# Patient Record
Sex: MALE | Race: WHITE | Employment: FULL TIME | URBAN - METROPOLITAN AREA
[De-identification: names, ages, dates, MRNs, and addresses within clinical notes are randomized per-mention and may not be internally consistent; named-entity substitution may affect disease eponyms.]

---

## 2020-06-17 ENCOUNTER — APPOINTMENT (OUTPATIENT)
Dept: GENERAL RADIOLOGY | Age: 35
End: 2020-06-17
Attending: PHYSICIAN ASSISTANT
Payer: COMMERCIAL

## 2020-06-17 ENCOUNTER — HOSPITAL ENCOUNTER (EMERGENCY)
Age: 35
Discharge: HOME | End: 2020-06-17
Attending: EMERGENCY MEDICINE | Admitting: EMERGENCY MEDICINE
Payer: COMMERCIAL

## 2020-06-17 VITALS
DIASTOLIC BLOOD PRESSURE: 72 MMHG | RESPIRATION RATE: 18 BRPM | TEMPERATURE: 97.9 F | SYSTOLIC BLOOD PRESSURE: 115 MMHG | BODY MASS INDEX: 32.27 KG/M2 | HEART RATE: 60 BPM | HEIGHT: 76 IN | OXYGEN SATURATION: 98 % | WEIGHT: 265 LBS

## 2020-06-17 DIAGNOSIS — Z20.822 SUSPECTED COVID-19 VIRUS INFECTION: ICD-10-CM

## 2020-06-17 DIAGNOSIS — B34.9 VIRAL INFECTION: Primary | ICD-10-CM

## 2020-06-17 LAB
ALBUMIN SERPL-MCNC: 3.7 G/DL (ref 3.5–5)
ALBUMIN/GLOB SERPL: 1 {RATIO} (ref 1.1–2.2)
ALP SERPL-CCNC: 64 U/L (ref 45–117)
ALT SERPL-CCNC: 32 U/L (ref 12–78)
ANION GAP SERPL CALC-SCNC: 4 MMOL/L (ref 5–15)
AST SERPL-CCNC: 11 U/L (ref 15–37)
ATRIAL RATE: 64 BPM
BASOPHILS # BLD: 0 K/UL (ref 0–0.1)
BASOPHILS NFR BLD: 0 % (ref 0–1)
BILIRUB SERPL-MCNC: 0.6 MG/DL (ref 0.2–1)
BUN SERPL-MCNC: 11 MG/DL (ref 6–20)
BUN/CREAT SERPL: 12 (ref 12–20)
CALCIUM SERPL-MCNC: 8.6 MG/DL (ref 8.5–10.1)
CALCULATED P AXIS, ECG09: 23 DEGREES
CALCULATED R AXIS, ECG10: 27 DEGREES
CALCULATED T AXIS, ECG11: 20 DEGREES
CHLORIDE SERPL-SCNC: 108 MMOL/L (ref 97–108)
CO2 SERPL-SCNC: 27 MMOL/L (ref 21–32)
COMMENT, HOLDF: NORMAL
CREAT SERPL-MCNC: 0.91 MG/DL (ref 0.7–1.3)
DIAGNOSIS, 93000: NORMAL
DIFFERENTIAL METHOD BLD: NORMAL
EOSINOPHIL # BLD: 0.1 K/UL (ref 0–0.4)
EOSINOPHIL NFR BLD: 1 % (ref 0–7)
ERYTHROCYTE [DISTWIDTH] IN BLOOD BY AUTOMATED COUNT: 12.8 % (ref 11.5–14.5)
GLOBULIN SER CALC-MCNC: 3.8 G/DL (ref 2–4)
GLUCOSE SERPL-MCNC: 83 MG/DL (ref 65–100)
HCT VFR BLD AUTO: 44.1 % (ref 36.6–50.3)
HGB BLD-MCNC: 14.4 G/DL (ref 12.1–17)
IMM GRANULOCYTES # BLD AUTO: 0 K/UL (ref 0–0.04)
IMM GRANULOCYTES NFR BLD AUTO: 0 % (ref 0–0.5)
LYMPHOCYTES # BLD: 2 K/UL (ref 0.8–3.5)
LYMPHOCYTES NFR BLD: 28 % (ref 12–49)
MCH RBC QN AUTO: 27.1 PG (ref 26–34)
MCHC RBC AUTO-ENTMCNC: 32.7 G/DL (ref 30–36.5)
MCV RBC AUTO: 83.1 FL (ref 80–99)
MONOCYTES # BLD: 0.5 K/UL (ref 0–1)
MONOCYTES NFR BLD: 8 % (ref 5–13)
NEUTS SEG # BLD: 4.5 K/UL (ref 1.8–8)
NEUTS SEG NFR BLD: 63 % (ref 32–75)
NRBC # BLD: 0 K/UL (ref 0–0.01)
NRBC BLD-RTO: 0 PER 100 WBC
P-R INTERVAL, ECG05: 182 MS
PLATELET # BLD AUTO: 348 K/UL (ref 150–400)
PMV BLD AUTO: 9.6 FL (ref 8.9–12.9)
POTASSIUM SERPL-SCNC: 3.8 MMOL/L (ref 3.5–5.1)
PROT SERPL-MCNC: 7.5 G/DL (ref 6.4–8.2)
Q-T INTERVAL, ECG07: 414 MS
QRS DURATION, ECG06: 90 MS
QTC CALCULATION (BEZET), ECG08: 427 MS
RBC # BLD AUTO: 5.31 M/UL (ref 4.1–5.7)
SAMPLES BEING HELD,HOLD: NORMAL
SODIUM SERPL-SCNC: 139 MMOL/L (ref 136–145)
TROPONIN I SERPL-MCNC: <0.05 NG/ML
VENTRICULAR RATE, ECG03: 64 BPM
WBC # BLD AUTO: 7.1 K/UL (ref 4.1–11.1)

## 2020-06-17 PROCEDURE — 80053 COMPREHEN METABOLIC PANEL: CPT

## 2020-06-17 PROCEDURE — 84484 ASSAY OF TROPONIN QUANT: CPT

## 2020-06-17 PROCEDURE — 71045 X-RAY EXAM CHEST 1 VIEW: CPT

## 2020-06-17 PROCEDURE — 85025 COMPLETE CBC W/AUTO DIFF WBC: CPT

## 2020-06-17 PROCEDURE — 87635 SARS-COV-2 COVID-19 AMP PRB: CPT

## 2020-06-17 PROCEDURE — 36415 COLL VENOUS BLD VENIPUNCTURE: CPT

## 2020-06-17 PROCEDURE — 99284 EMERGENCY DEPT VISIT MOD MDM: CPT

## 2020-06-17 PROCEDURE — 74011250636 HC RX REV CODE- 250/636: Performed by: PHYSICIAN ASSISTANT

## 2020-06-17 PROCEDURE — 93005 ELECTROCARDIOGRAM TRACING: CPT

## 2020-06-17 RX ORDER — OMEPRAZOLE 20 MG/1
20 TABLET, DELAYED RELEASE ORAL 2 TIMES DAILY
COMMUNITY

## 2020-06-17 RX ORDER — MESALAMINE 1.2 G/1
1.2 TABLET, DELAYED RELEASE ORAL 2 TIMES DAILY
COMMUNITY

## 2020-06-17 RX ADMIN — SODIUM CHLORIDE 1000 ML: 900 INJECTION, SOLUTION INTRAVENOUS at 14:34

## 2020-06-17 NOTE — ED PROVIDER NOTES
Date of Service:  6/17/2020    Patient:  Zana Otoole Chief Complaint:  Chills and Fatigue       HPI:  Zana Otoole is a 38 y.o.  male who presents for evaluation of 3 days chills, fatigue, headaches/bodyaches, chest heaviness. No fever, lightheadedness, dizziness, SOB, cough, abdominal pain, dysuria, urgency, frequency, numbness/tingling/weakness. 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 History of asthma. Past Medical History:   Diagnosis Date    GERD (gastroesophageal reflux disease)     Ulcerative colitis ([facility] [address]        Past Surgical History:   Procedure Laterality Date    HX ACL RECONSTRUCTION      Right    HX APPENDECTOMY           History reviewed. No pertinent family history. Social History     Socioeconomic History    Marital status:      Spouse name: Not on file    Number of children: Not on file    Years of education: Not on file    Highest education level: Not on file   Occupational History    Not on file   Social Needs    Financial resource strain: Not on file    Food insecurity     Worry: Not on file     Inability: Not on file    Transportation needs     Medical: Not on file     Non-medical: Not on file   Tobacco Use    Smoking status: Never Smoker    Smokeless tobacco: Never Used   Substance and Sexual Activity    Alcohol use:  Yes     Alcohol/week: 1.0 standard drinks     Types: 1 Cans of beer per week    Drug use: Never    Sexual activity: Not on file   Lifestyle    Physical activity     Days per week: Not on file     Minutes per session: Not on file    Stress: Not on file   Relationships    Social connections     Talks on phone: Not on file     Gets together: Not on file     Attends Islam service: Not on file     Active member of club or organization: Not on file     Attends meetings of clubs or organizations: Not on file     Relationship status: Not on file    Intimate partner violence     Fear of current or ex partner: Not on file     Emotionally abused: Not on file     Physically abused: Not on file     Forced sexual activity: Not on file   Other Topics Concern    Not on file   Social History Narrative    Not on file         ALLERGIES: Patient has no known allergies. Review of Systems   Constitutional: Positive for chills and fatigue. Negative for fever. HENT: Negative for congestion and sore throat. Eyes: Negative for pain. Respiratory: Negative for cough and shortness of breath. Cardiovascular: Positive for chest pain. Negative for palpitations. Gastrointestinal: Negative for abdominal pain, diarrhea, nausea and vomiting. Genitourinary: Negative for dysuria, frequency and urgency. Musculoskeletal: Negative for back pain and neck pain. Neurological: Negative for dizziness, light-headedness and headaches. Vitals:    06/17/20 1313 06/17/20 1339 06/17/20 1400 06/17/20 1430   BP: 134/87  120/79 118/81   Pulse: 69      Resp: 18      Temp: 98.5 °F (36.9 °C)      SpO2: 96% 99% 98% 99%   Weight: 120.2 kg (265 lb)      Height: 6' 4\" (1.93 m)               Physical Exam  Vitals signs and nursing note reviewed. Constitutional:       Appearance: Normal appearance. HENT:      Head: Normocephalic and atraumatic. Nose: Nose normal.   Eyes:      Extraocular Movements: Extraocular movements intact. Conjunctiva/sclera: Conjunctivae normal.      Pupils: Pupils are equal, round, and reactive to light. Neck:      Musculoskeletal: Normal range of motion and neck supple. Cardiovascular:      Rate and Rhythm: Normal rate and regular rhythm. Pulses: Normal pulses. Heart sounds: Normal heart sounds. Pulmonary:      Effort: Pulmonary effort is normal.      Breath sounds: Normal breath sounds. Abdominal:      General: Abdomen is flat. Bowel sounds are normal.      Palpations: Abdomen is soft. Musculoskeletal: Normal range of motion. Skin:     General: Skin is warm and dry. Neurological:      General: No focal deficit present. Mental Status: He is alert and oriented to person, place, and time. Mental status is at baseline. Psychiatric:         Mood and Affect: Mood normal.         Behavior: Behavior normal.         Thought Content: Thought content normal.          MDM  Number of Diagnoses or Management Options  Suspected COVID-19 virus infection:   Viral infection:   Diagnosis management comments: LABS:  Recent Results (from the past 6 hour(s))  -EKG, 12 LEAD, INITIAL  Collection Time: 06/17/20  2:29 PM       Result                      Value             Ref Range           Ventricular Rate            64                BPM                 Atrial Rate                 64                BPM                 P-R Interval                182               ms                  QRS Duration                90                ms                  Q-T Interval                414               ms                  QTC Calculation (Bezet)     427               ms                  Calculated P Axis           23                degrees             Calculated R Axis           27                degrees             Calculated T Axis           20                degrees             Diagnosis                                                     Normal sinus rhythm nonspecific ST changes.   Borderline ECG No previous ECGs available Confirmed by Danielle AGUILA, Chitra Son ([id number]) on 6/17/2020 3:43:54 PM   -CBC WITH AUTOMATED DIFF  Collection Time: 06/17/20  2:35 PM       Result                      Value             Ref Range           WBC                         7.1               4.1 - 11.1 K*       RBC                         5.31              4.10 - 5.70 *       HGB                         14.4              12.1 - 17.0 *       HCT                         44.1              36.6 - 50.3 %       MCV                         83.1              80.0 - 99.0 *       MCH                         27.1              26.0 - 34.0 *       MCHC                        32.7              30.0 - 36.5 *       RDW                         12.8              11.5 - 14.5 %       PLATELET                    348               150 - 400 K/*       MPV                         9.6               8.9 - 12.9 FL       NRBC                        0.0               0  WBC       ABSOLUTE NRBC               0.00              0.00 - 0.01 *       NEUTROPHILS                 63                32 - 75 %           LYMPHOCYTES                 28                12 - 49 %           MONOCYTES                   8                 5 - 13 %            EOSINOPHILS                 1                 0 - 7 %             BASOPHILS                   0                 0 - 1 %             IMMATURE GRANULOCYTES       0                 0.0 - 0.5 %         ABS. NEUTROPHILS            4.5               1.8 - 8.0 K/*       ABS. LYMPHOCYTES            2.0               0.8 - 3.5 K/*       ABS. MONOCYTES              0.5               0.0 - 1.0 K/*       ABS. EOSINOPHILS            0.1               0.0 - 0.4 K/*       ABS. BASOPHILS              0.0               0.0 - 0.1 K/*       ABS. IMM.  GRANS.            0.0               0.00 - 0.04 *       DF                          AUTOMATED                        -METABOLIC PANEL, COMPREHENSIVE  Collection Time: 06/17/20  2:35 PM       Result                      Value             Ref Range           Sodium                      139               136 - 145 mm*       Potassium                   3.8               3.5 - 5.1 mm*       Chloride                    108               97 - 108 mmo*       CO2                         27                21 - 32 mmol*       Anion gap                   4 (L)             5 - 15 mmol/L       Glucose                     83                65 - 100 mg/*       BUN                         11                6 - 20 MG/DL        Creatinine                  0.91              0.70 - 1.30 *       BUN/Creatinine ratio 12                12 - 20             GFR est AA                  >60               >60 ml/min/1*       GFR est non-AA              >60               >60 ml/min/1*       Calcium                     8.6               8.5 - 10.1 M*       Bilirubin, total            0.6               0.2 - 1.0 MG*       ALT (SGPT)                  32                12 - 78 U/L         AST (SGOT)                  11 (L)            15 - 37 U/L         Alk. phosphatase            64                45 - 117 U/L        Protein, total              7.5               6.4 - 8.2 g/*       Albumin                     3.7               3.5 - 5.0 g/*       Globulin                    3.8               2.0 - 4.0 g/*       A-G Ratio                   1.0 (L)           1.1 - 2.2      -TROPONIN I  Collection Time: 06/17/20  2:35 PM       Result                      Value             Ref Range           Troponin-I, Qt.             <0.05             <0.05 ng/mL    -SAMPLES BEING HELD  Collection Time: 06/17/20  2:35 PM       Result                      Value             Ref Range           SAMPLES BEING HELD          1BL,1SST,1RD                          COMMENT                                                       Add-on orders for these samples will be processed based on acceptable specimen integrity and analyte stability, which may vary by analyte.   -SARS-COV-2  Collection Time: 06/17/20  3:36 PM       Result                      Value             Ref Range           Specimen source                                               Nasopharyngeal       SARS-CoV-2                  PENDING                               SARS-CoV-2                  PENDING                               Specimen source                                               Nasopharyngeal       COVID-19 rapid test         PENDING                               COVID-19                    PENDING           NEG               IMAGING:  XR CHEST PORT   Final Result    IMPRESSION: Clear lungs. Medications During Visit:  Medications  sodium chloride 0.9 % bolus infusion 1,000 mL (0 mL IntraVENous IV Completed 6/17/20 1533)      DECISION MAKING:  Joe Keys is a 38 y.o. male who comes in as above. 3 days chills, fatigue, headaches/bodyaches, chest heaviness, no additional ROS. 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 History of asthma. Vitals normal. Patient resting comfortably, no acute distress, no respiratory distress. Imaging, blood wok shows no clear etiology, COVID test pending. Discussed strict quarantine x 2 weeks. Discussed with patient follow up with PCP within 1 week. Return to ED if any worsening symptoms or additional concerns. IMPRESSION:  Viral infection  (primary encounter diagnosis)  Suspected COVID-19 virus infection    DISPOSITION:  Discharged      Discharge Medication List as of 6/17/2020  3:29 PM         Follow-up Information     Follow up With Specialties Details Why [address] [place] [address],[address]  Schedule an appointment as soon as   possible for a visit  As needed [address] [facility] [address] [phone]    [facility] EMERGENCY DEPT Emergency Medicine Go to  If symptoms worsen [address] [place] [address]  [phone]          The patient is asked to follow-up with their primary care provider in the next several days. They are to call tomorrow for an appointment. The patient is asked to return promptly for any increased concerns or worsening of symptoms. They can return to this emergency department or any other emergency department. Procedures    Joe Keys was evaluated in the Emergency Department on 6/17/2020 for the symptoms described in the history of present illness. He/she was evaluated in the context of the global COVID-19 pandemic, which necessitated consideration that the patient might be at risk for infection with the SARS-CoV-2 virus that causes COVID-19. Institutional protocols and algorithms that pertain to the evaluation of patients at risk for COVID-19 are in a state of rapid change based on information released by regulatory bodies including the CDC and federal and state organizations. These policies and algorithms were followed during the patient's care in the ED.     Surrogate Decision Maker (Who do you want to make decisions for you in the event you are not able to?): Extended Emergency Contact Information  Primary Emergency Contact: ash vázquez  Mobile Phone: [phone]  Relation: Spouse    Ventilation (Do you want to be intubated and mechanically ventilated?):  Yes    CPR (Do you want chest compressions and electricity in an attempt to restart your heart?): Yes

## 2020-06-17 NOTE — DISCHARGE INSTRUCTIONS
You have been diagnosed with a respiratory illness. Most respiratory illnesses are due viruses but some may be caused by bacteria. Viral illnesses usually get better with time and rest.   If you were diagnosed with a bacterial illness you will likely receive an antibiotic. Please take the following precautions to limit the spread of illness. Stay at home except to get medical care. Seek medical attention if you develop worsening symptoms or new concerns such as severe shortness of breath, chest pain, etc.    Separate yourself from other people and animals in your home. If possible, stay in a separate room and use a separate bathroom from others in your house. Restrict contact with pets, as there is a possibility of transmission of the virus. Call your doctor before showing up at their office. Wear a facemask when you are around other people. Cover your mouth when you cough or sneeze. Wash your hands often with warm soapy water for at least 20 seconds. If soap and water are not available, use an alcohol based hand . Clean all high touch surfaces everyday. For example: counters, tabletops, doorknobs, bathroom fixtures, toilets, phones, keyboards, tablets, and bedside tables. Monitor your symptoms at home. Seek prompt medical attention if your symptoms worsen. (i.e. difficulty breathing). Wear a mask upon entering the facility. Stay at home until all these things have happened: You have had no fevers for at least 72 hours (without fever reducing meds)  AND  Your other symptoms have improved  (e.g. cough, shortness of breath) AND  At least 7 days have passed since the beginning of your symptoms    If you have further questions about the Coronavirus (COVID-19), please contact the [address] [place] [address] at [phone], the [employer] at [phone] or [facility] [phone].

## 2020-06-17 NOTE — LETTER
[facility] EMERGENCY DEPT 
[address] [facility] [address] [phone] Work/School Note Date: 6/17/2020 To Whom It May concern: 
 
Ricapple Gucci. was seen and treated today in the emergency room by the following provider(s): 
Attending Provider: Brigitte Mo MD 
Physician Assistant: Lara Cervantes PA-C. Tatiana Duckworth. may return to work on 07/01/2020. Sincerely, Dasia De La Garza PA-C

## 2020-06-18 ENCOUNTER — PATIENT OUTREACH (OUTPATIENT)
Dept: FAMILY MEDICINE CLINIC | Age: 35
End: 2020-06-18

## 2020-06-18 LAB
SARS-COV-2, COV2NT: NOT DETECTED
SOURCE, COVRS: NORMAL
SPECIMEN SOURCE, FCOV2M: NORMAL

## 2020-07-02 ENCOUNTER — PATIENT OUTREACH (OUTPATIENT)
Dept: FAMILY MEDICINE CLINIC | Age: 35
End: 2020-07-02

## 2020-07-02 NOTE — PROGRESS NOTES
Patient resolved from Transition of Care episode on 7/2/20. ACM/CTN was unsuccessful at contacting this patient today. Patient/family was provided the following resources and education related to COVID-19 during the initial call:                         Signs, symptoms and red flags related to COVID-19            CDC exposure and quarantine guidelines            [facility] exposure contact - [phone]            Contact for their local Department of Health                 Patient has not had any additional ED or hospital visits. No further outreach scheduled with this CTN/ACM. Episode of Care resolved. Patient has this CTN/ACM contact information if future needs arise.

## 2020-10-29 ENCOUNTER — APPOINTMENT (OUTPATIENT)
Dept: GENERAL RADIOLOGY | Age: 35
End: 2020-10-29
Attending: NURSE PRACTITIONER
Payer: COMMERCIAL

## 2020-10-29 ENCOUNTER — HOSPITAL ENCOUNTER (EMERGENCY)
Age: 35
Discharge: HOME | End: 2020-10-29
Attending: EMERGENCY MEDICINE
Payer: COMMERCIAL

## 2020-10-29 VITALS
TEMPERATURE: 99.1 F | DIASTOLIC BLOOD PRESSURE: 86 MMHG | HEART RATE: 72 BPM | OXYGEN SATURATION: 96 % | RESPIRATION RATE: 18 BRPM | BODY MASS INDEX: 32.88 KG/M2 | SYSTOLIC BLOOD PRESSURE: 159 MMHG | HEIGHT: 76 IN | WEIGHT: 270 LBS

## 2020-10-29 DIAGNOSIS — G89.18 ACUTE POST-OPERATIVE PAIN: Primary | ICD-10-CM

## 2020-10-29 LAB
ANION GAP SERPL CALC-SCNC: 5 MMOL/L (ref 5–15)
BASOPHILS # BLD: 0 K/UL (ref 0–0.1)
BASOPHILS NFR BLD: 0 % (ref 0–1)
BUN SERPL-MCNC: 14 MG/DL (ref 6–20)
BUN/CREAT SERPL: 13 (ref 12–20)
CALCIUM SERPL-MCNC: 9.2 MG/DL (ref 8.5–10.1)
CHLORIDE SERPL-SCNC: 106 MMOL/L (ref 97–108)
CK SERPL-CCNC: 373 U/L (ref 39–308)
CO2 SERPL-SCNC: 24 MMOL/L (ref 21–32)
COMMENT, HOLDF: NORMAL
CREAT SERPL-MCNC: 1.12 MG/DL (ref 0.7–1.3)
DIFFERENTIAL METHOD BLD: ABNORMAL
EOSINOPHIL # BLD: 0 K/UL (ref 0–0.4)
EOSINOPHIL NFR BLD: 0 % (ref 0–7)
ERYTHROCYTE [DISTWIDTH] IN BLOOD BY AUTOMATED COUNT: 12.9 % (ref 11.5–14.5)
GLUCOSE SERPL-MCNC: 103 MG/DL (ref 65–100)
HCT VFR BLD AUTO: 42.5 % (ref 36.6–50.3)
HGB BLD-MCNC: 14.3 G/DL (ref 12.1–17)
IMM GRANULOCYTES # BLD AUTO: 0.1 K/UL (ref 0–0.04)
IMM GRANULOCYTES NFR BLD AUTO: 1 % (ref 0–0.5)
LACTATE SERPL-SCNC: 2 MMOL/L (ref 0.4–2)
LYMPHOCYTES # BLD: 2.1 K/UL (ref 0.8–3.5)
LYMPHOCYTES NFR BLD: 17 % (ref 12–49)
MCH RBC QN AUTO: 27.6 PG (ref 26–34)
MCHC RBC AUTO-ENTMCNC: 33.6 G/DL (ref 30–36.5)
MCV RBC AUTO: 82 FL (ref 80–99)
MONOCYTES # BLD: 1 K/UL (ref 0–1)
MONOCYTES NFR BLD: 9 % (ref 5–13)
NEUTS SEG # BLD: 8.6 K/UL (ref 1.8–8)
NEUTS SEG NFR BLD: 73 % (ref 32–75)
NRBC # BLD: 0 K/UL (ref 0–0.01)
NRBC BLD-RTO: 0 PER 100 WBC
PLATELET # BLD AUTO: 425 K/UL (ref 150–400)
PMV BLD AUTO: 9.5 FL (ref 8.9–12.9)
POTASSIUM SERPL-SCNC: 3.2 MMOL/L (ref 3.5–5.1)
RBC # BLD AUTO: 5.18 M/UL (ref 4.1–5.7)
SAMPLES BEING HELD,HOLD: NORMAL
SODIUM SERPL-SCNC: 135 MMOL/L (ref 136–145)
WBC # BLD AUTO: 11.8 K/UL (ref 4.1–11.1)

## 2020-10-29 PROCEDURE — 74011250637 HC RX REV CODE- 250/637: Performed by: EMERGENCY MEDICINE

## 2020-10-29 PROCEDURE — 74011250636 HC RX REV CODE- 250/636: Performed by: NURSE PRACTITIONER

## 2020-10-29 PROCEDURE — 85025 COMPLETE CBC W/AUTO DIFF WBC: CPT

## 2020-10-29 PROCEDURE — 96374 THER/PROPH/DIAG INJ IV PUSH: CPT

## 2020-10-29 PROCEDURE — 36415 COLL VENOUS BLD VENIPUNCTURE: CPT

## 2020-10-29 PROCEDURE — 80048 BASIC METABOLIC PNL TOTAL CA: CPT

## 2020-10-29 PROCEDURE — 99285 EMERGENCY DEPT VISIT HI MDM: CPT

## 2020-10-29 PROCEDURE — 74011250637 HC RX REV CODE- 250/637: Performed by: NURSE PRACTITIONER

## 2020-10-29 PROCEDURE — 96375 TX/PRO/DX INJ NEW DRUG ADDON: CPT

## 2020-10-29 PROCEDURE — 74011250636 HC RX REV CODE- 250/636: Performed by: EMERGENCY MEDICINE

## 2020-10-29 PROCEDURE — 82550 ASSAY OF CK (CPK): CPT

## 2020-10-29 PROCEDURE — 83605 ASSAY OF LACTIC ACID: CPT

## 2020-10-29 PROCEDURE — 73562 X-RAY EXAM OF KNEE 3: CPT

## 2020-10-29 RX ORDER — HYDROMORPHONE HYDROCHLORIDE 2 MG/1
4 TABLET ORAL
Status: COMPLETED | OUTPATIENT
Start: 2020-10-29 | End: 2020-10-29

## 2020-10-29 RX ORDER — DIAZEPAM 10 MG/2ML
5 INJECTION INTRAMUSCULAR
Status: COMPLETED | OUTPATIENT
Start: 2020-10-29 | End: 2020-10-29

## 2020-10-29 RX ORDER — HYDROMORPHONE HYDROCHLORIDE 2 MG/ML
1 INJECTION, SOLUTION INTRAMUSCULAR; INTRAVENOUS; SUBCUTANEOUS ONCE
Status: COMPLETED | OUTPATIENT
Start: 2020-10-29 | End: 2020-10-29

## 2020-10-29 RX ORDER — DIAZEPAM 10 MG/2ML
5 INJECTION INTRAMUSCULAR
Status: DISCONTINUED | OUTPATIENT
Start: 2020-10-29 | End: 2020-10-29

## 2020-10-29 RX ORDER — ONDANSETRON 4 MG/1
8 TABLET, ORALLY DISINTEGRATING ORAL
Status: COMPLETED | OUTPATIENT
Start: 2020-10-29 | End: 2020-10-29

## 2020-10-29 RX ORDER — HYDROMORPHONE HYDROCHLORIDE 2 MG/ML
1 INJECTION, SOLUTION INTRAMUSCULAR; INTRAVENOUS; SUBCUTANEOUS ONCE
Status: DISCONTINUED | OUTPATIENT
Start: 2020-10-29 | End: 2020-10-29

## 2020-10-29 RX ORDER — ONDANSETRON 2 MG/ML
8 INJECTION INTRAMUSCULAR; INTRAVENOUS
Status: DISCONTINUED | OUTPATIENT
Start: 2020-10-29 | End: 2020-10-29

## 2020-10-29 RX ADMIN — HYDROMORPHONE HYDROCHLORIDE 4 MG: 2 TABLET ORAL at 18:47

## 2020-10-29 RX ADMIN — DIAZEPAM 5 MG: 5 INJECTION, SOLUTION INTRAMUSCULAR; INTRAVENOUS at 17:21

## 2020-10-29 RX ADMIN — HYDROMORPHONE HYDROCHLORIDE 1 MG: 2 INJECTION, SOLUTION INTRAMUSCULAR; INTRAVENOUS; SUBCUTANEOUS at 19:25

## 2020-10-29 RX ADMIN — ONDANSETRON 8 MG: 4 TABLET, ORALLY DISINTEGRATING ORAL at 17:06

## 2020-10-29 NOTE — ED NOTES
Dr. Brendon Barth notified of patient's potassium level of 3.2; as well as sodium level and lactic. No orders given at this time. Will continue to monitor.

## 2020-10-29 NOTE — ED TRIAGE NOTES
Pt reports he had an outpatient surgery for a left quadriceps muscle rupture yesterday by Dr. Brenden Luz at [facility]. Pt states he had a nerve block so he was okay when he was discharged but since then he has not been able to get his pain under control. Also reports developed a fever today. Has not taken any antipyretics at home and afebrile in triage. Pt states he is nauseated and feeling SOB secondary to the pain. Called his surgeon who changed his prescriptions but he took one dose without relief.

## 2020-10-30 NOTE — ED PROVIDER NOTES
42-year-old male status post surgical repair and hamstring on 10/28 presents to the emergency department noting severe uncontrolled pain and muscle spasms in his left lower extremity. He states \"as soon as the nerve block wore off the pain has just been intolerable. He called his orthopedist office and had his p.o. pain medications changed to 2 mg hydromorphone and he was Rx'd tizanidine for muscle spasm. He states that he is taking his medications at home to no avail of his symptoms. He denies any trauma to the area since his operation and states that he has been trying to keep his leg elevated and resting the whole time. He noted fever earlier today around 80 which she treated at home and presents to the ED afebrile. He denies any significant redness surrounding his incision, chest pain, shortness of breath, or any other medical concerns. Post-Op Problem   Pertinent negatives include no chest pain, no abdominal pain, no headaches and no shortness of breath. Past Medical History:   Diagnosis Date    GERD (gastroesophageal reflux disease)     Ulcerative colitis ([facility] [address]        Past Surgical History:   Procedure Laterality Date    HX ACL RECONSTRUCTION      Right    HX APPENDECTOMY           No family history on file. Social History     Socioeconomic History    Marital status:      Spouse name: Not on file    Number of children: Not on file    Years of education: Not on file    Highest education level: Not on file   Occupational History    Not on file   Social Needs    Financial resource strain: Not on file    Food insecurity     Worry: Not on file     Inability: Not on file    Transportation needs     Medical: Not on file     Non-medical: Not on file   Tobacco Use    Smoking status: Never Smoker    Smokeless tobacco: Never Used   Substance and Sexual Activity    Alcohol use:  Yes     Alcohol/week: 1.0 standard drinks     Types: 1 Cans of beer per week    Drug use: Never    Sexual activity: Not on file   Lifestyle    Physical activity     Days per week: Not on file     Minutes per session: Not on file    Stress: Not on file   Relationships    Social connections     Talks on phone: Not on file     Gets together: Not on file     Attends Voodoo service: Not on file     Active member of club or organization: Not on file     Attends meetings of clubs or organizations: Not on file     Relationship status: Not on file    Intimate partner violence     Fear of current or ex partner: Not on file     Emotionally abused: Not on file     Physically abused: Not on file     Forced sexual activity: Not on file   Other Topics Concern    Not on file   Social History Narrative    Not on file         ALLERGIES: Patient has no known allergies. Review of Systems   Constitutional: Positive for activity change. Negative for appetite change, chills and fever. HENT: Negative for congestion, rhinorrhea, sinus pain, sneezing and sore throat. Eyes: Negative for photophobia and visual disturbance. Respiratory: Negative for cough and shortness of breath. Cardiovascular: Negative for chest pain. Gastrointestinal: Negative for abdominal pain, blood in stool, constipation, diarrhea, nausea and vomiting. Genitourinary: Negative for difficulty urinating, dysuria, flank pain, hematuria, penile pain and testicular pain. Musculoskeletal: Positive for myalgias (Left quadricep). Negative for arthralgias, back pain and neck pain. Skin: Positive for wound (Surgical wound). Negative for rash. Neurological: Negative for syncope, weakness, light-headedness, numbness and headaches. Psychiatric/Behavioral: Negative for self-injury and suicidal ideas. All other systems reviewed and are negative.       Vitals:    10/29/20 1745 10/29/20 1830 10/29/20 1845 10/29/20 1846   BP: (!) 157/90 (!) 146/85 (!) 159/86    Pulse: 65 68 72    Resp: 13 16 18    Temp:  99.1 °F (37.3 °C)  99.1 °F (37.3 °C)   SpO2: 98% 97% 96%    Weight:       Height:                Physical Exam  Vitals signs and nursing note reviewed. Constitutional:       General: He is not in acute distress. Appearance: Normal appearance. He is well-developed. He is not diaphoretic. HENT:      Head: Normocephalic and atraumatic. Nose: Nose normal.   Eyes:      Extraocular Movements: Extraocular movements intact. Conjunctiva/sclera: Conjunctivae normal.      Pupils: Pupils are equal, round, and reactive to light. Neck:      Musculoskeletal: Neck supple. Cardiovascular:      Rate and Rhythm: Normal rate and regular rhythm. Heart sounds: Normal heart sounds. Pulmonary:      Effort: Pulmonary effort is normal.      Breath sounds: Normal breath sounds. Abdominal:      General: There is no distension. Palpations: Abdomen is soft. Tenderness: There is no abdominal tenderness. Musculoskeletal:      Left knee: He exhibits decreased range of motion. Left upper leg: He exhibits tenderness. He exhibits no bony tenderness. Legs:    Skin:     General: Skin is warm and dry. Neurological:      General: No focal deficit present. Mental Status: He is alert and oriented to person, place, and time. Cranial Nerves: No cranial nerve deficit. Sensory: No sensory deficit. Motor: No weakness. Coordination: Coordination normal.          MDM   58-year-old male presents with severe postoperative pain after muscle surgery yesterday unalleviated by p.o. pain relievers Rx'd by his orthopedist.  In the ED patient is afebrile with mild hypertension but otherwise vital signs stable. He is given IV pain medication and Valium to good effect of his symptoms in the ED. Labs returned showing slightly elevated CPK at 373, but normal lactic acid, WBC 11. 8. X-ray of his left knee shows soft tissue swelling anteriorly with a small amount of gas, postoperative in nature but no acute abnormalities.     Orthopedics was consulted, Cherylann Shams, Lyndell Dakins, area reviewed his results and states that his pain is likely postoperative and severe after the nerve block has worn off. They recommend increasing his 2 mg hydromorphone to 4 mg p.o. every 4-6 hours and otherwise recommended he continue his other medications. As the patient's pain is now significantly more controlled in the ED feel that he is stable for discharge and will follow up closely with his orthopedist tomorrow in clinic. Return precautions were given for worsening or concerns. This plan was discussed with the patient and his wife at the bedside and they stated both understanding and agreement.     Procedures

## 2021-10-07 ENCOUNTER — TRANSCRIBE ORDER (OUTPATIENT)
Dept: SCHEDULING | Age: 36
End: 2021-10-07

## 2021-10-07 DIAGNOSIS — R10.9 ABDOMINAL PAIN: Primary | ICD-10-CM
